# Patient Record
Sex: FEMALE | Race: OTHER
[De-identification: names, ages, dates, MRNs, and addresses within clinical notes are randomized per-mention and may not be internally consistent; named-entity substitution may affect disease eponyms.]

---

## 2018-08-06 ENCOUNTER — HOSPITAL ENCOUNTER (EMERGENCY)
Dept: HOSPITAL 25 - ED | Age: 38
Discharge: HOME | End: 2018-08-06
Payer: COMMERCIAL

## 2018-08-06 VITALS — SYSTOLIC BLOOD PRESSURE: 106 MMHG | DIASTOLIC BLOOD PRESSURE: 69 MMHG

## 2018-08-06 DIAGNOSIS — N83.01: ICD-10-CM

## 2018-08-06 DIAGNOSIS — R10.32: Primary | ICD-10-CM

## 2018-08-06 DIAGNOSIS — D25.9: ICD-10-CM

## 2018-08-06 DIAGNOSIS — N83.02: ICD-10-CM

## 2018-08-06 LAB
BASOPHILS # BLD AUTO: 0.1 10^3/UL (ref 0–0.2)
EOSINOPHIL # BLD AUTO: 0.2 10^3/UL (ref 0–0.6)
HCT VFR BLD AUTO: 38 % (ref 35–47)
HGB BLD-MCNC: 12.4 G/DL (ref 12–16)
LYMPHOCYTES # BLD AUTO: 2.2 10^3/UL (ref 1–4.8)
MCH RBC QN AUTO: 26 PG (ref 27–31)
MCHC RBC AUTO-ENTMCNC: 32 G/DL (ref 31–36)
MCV RBC AUTO: 81 FL (ref 80–97)
MONOCYTES # BLD AUTO: 0.4 10^3/UL (ref 0–0.8)
NEUTROPHILS # BLD AUTO: 3 10^3/UL (ref 1.5–7.7)
NRBC # BLD AUTO: 0 10^3/UL
NRBC BLD QL AUTO: 0.1
PLATELET # BLD AUTO: 212 10^3/UL (ref 150–450)
RBC # BLD AUTO: 4.74 10^6/UL (ref 4–5.4)
WBC # BLD AUTO: 5.9 10^3/UL (ref 3.5–10.8)

## 2018-08-06 PROCEDURE — 85025 COMPLETE CBC W/AUTO DIFF WBC: CPT

## 2018-08-06 PROCEDURE — 86140 C-REACTIVE PROTEIN: CPT

## 2018-08-06 PROCEDURE — 99282 EMERGENCY DEPT VISIT SF MDM: CPT

## 2018-08-06 PROCEDURE — 96372 THER/PROPH/DIAG INJ SC/IM: CPT

## 2018-08-06 PROCEDURE — 36415 COLL VENOUS BLD VENIPUNCTURE: CPT

## 2018-08-06 PROCEDURE — 84702 CHORIONIC GONADOTROPIN TEST: CPT

## 2018-08-06 PROCEDURE — 81003 URINALYSIS AUTO W/O SCOPE: CPT

## 2018-08-06 PROCEDURE — 76830 TRANSVAGINAL US NON-OB: CPT

## 2018-08-06 PROCEDURE — 80053 COMPREHEN METABOLIC PANEL: CPT

## 2018-08-06 NOTE — RAD
INDICATION: 1 week of left pelvic pain



COMPARISON: None.

 

TECHNIQUE: Real-time transabdominal and transvaginal  ultrasound examination of the female

pelvis including grayscale and Doppler color flow imaging.



FINDINGS: 



Uterus: The uterus measures 7.4 x 4.0 x 4.8 cm. The endometrial stripe measures 12 mm in

thickness. At the mid-level left of midline uterus there is a hypoechoic

well-circumscribed structure measuring 1.4 x 1.0 x 1.3 cm most consistent with a uterine

fibroid.



Ovaries: The right and left ovary measure 2.8 x 2.4 x 2.5 cm and 4.5 x 3.0 x 3.6 cm,

respectively. Normal arterial and venous waveforms are identified. At the right ovary

there is a echogenically heterogeneous, partially cystic and minimally vascular structure

measuring 1.8 x 1.3 x 1.7 cm. In the left ovary there is an anechoic avascular structure

measuring 3.4 x 2.8 x 3.0 cm.



There is moderate amount of free fluid in the cul-de-sac.



IMPRESSION: 

1. Small uterine fibroid measuring 1.4 cm in greatest dimension.

2. Bilateral ovarian follicles, complex on the right. Follow-up ultrasound can be acquired

in 6 weeks to ascertain resolution.

3. Small amount of endocervical fluid as well as free fluid in the cul-de-sac which is not

necessarily pathologic in a woman of reproductive age. Please correlate to stage of

menstruation.

## 2018-08-06 NOTE — ED
Abdominal Pain/Female





- HPI Summary


HPI Summary: 


This is lata Parikh documenting for attending Vlad Crews MD.


This patient is a 38 year old F presenting to ED with a chief complaint of L-

sided abdominal pain since 1 week ago. The patient rates the pain 8-9/10 in 

severity. Symptoms aggravated by nothing. Symptoms alleviated by nothing. 

Patient reports frequent urination. Patient denies dysuria and N/V. The patient 

went to [facility] and they said they were not sure what the pain was due 

to but wanted her to come to the ED to r/o ovarian cyst. LMP was July 20, 2018.





- History of Current Complaint


Chief Complaint: EDAbdPain


Stated Complaint: ABD PAIN


Time Seen by Provider: 08/06/18 15:28


Hx Obtained From: Patient


Onset/Duration: Sudden Onset, Lasting Weeks - 1 week ago, Still Present


Timing: Constant - since 1 week ago


Severity Initially: Severe - 8-9/10


Severity Currently: Severe - 8-9/10


Pain Intensity: 8


Pain Scale Used: 0-10 Numeric


Location: Other - L-sided abdominal pain


Aggravating Factor(s): Nothing


Alleviating Factor(s): Nothing


Associated Signs and Symptoms: Positive: Other: - Patient reports frequent 

urination. Patient denies dysuria and N/V.


Allergies/Adverse Reactions: 


 Allergies











Allergy/AdvReac Type Severity Reaction Status Date / Time


 


No Known Allergies Allergy   Verified 08/06/18 15:49














PMH/Surg Hx/FS Hx/Imm Hx


Endocrine/Hematology History: 


   Denies: Hx Diabetes


Cardiovascular History: 


   Denies: Hx Coronary Artery Disease, Hx Hypertension


GI History: Reports: Other GI Disorders - denies hx of constipation


Infectious Disease History: No


Infectious Disease History: 


   Denies: Traveled Outside the [place] in Last 30 Days





- Family History


Known Family History: 


   Negative: Cardiac Disease, Hypertension, Diabetes





- Social History


Alcohol Use: None


Substance Use Type: Reports: None


Smoking Status (MU): Never Smoked Tobacco





Review of Systems


Positive: Abdominal Pain - L-sided abdominal pain.  Negative: Vomiting, Nausea


Positive: frequency.  Negative: dysuria


All Other Systems Reviewed And Are Negative: Yes





Physical Exam





- Summary


Physical Exam Summary: 


VITAL SIGNS: Reviewed. 


GENERAL: Patient is a well-developed and nourished female who is lying 

comfortable in the stretcher. Patient is not in any acute respiratory distress. 


HEAD AND FACE: Normocephalic and atraumatic. 


EYES: PERRLA, EOMI x 2, No injected conjunctiva.


EARS: Hearing grossly intact. Ear canals and tympanic membranes are WNL.


MOUTH: Oropharynx within normal limits. 


NECK: Supple, trachea is midline, no adenopathy, no JVD.


CHEST: Symmetric, no tenderness at palpation 


LUNGS: Clear to auscultation bilaterally. No wheezing or crackles.


CVS: RRR, S1 and S2 present, no murmurs or gallops appreciated. 


ABDOMEN: Soft, L pelvic tenderness, LLQ tenderness. No signs of distention. 

Positive bowel sounds. No rebound no guarding, and no masses palpated. No 

abdominal bruit or pulsations. 


EXTREMITIES: FROM in all major joints, no edema, no cyanosis or clubbing.


NEURO: Alert and oriented x 3. No acute neurological deficits. Speech is normal.


SKIN: Dry and warm


Triage Information Reviewed: Yes


Vital Signs On Initial Exam: 


 Initial Vitals











Temp Pulse Resp BP Pulse Ox


 


 97.3 F   59   16   120/77   98 


 


 08/06/18 14:06  08/06/18 14:06  08/06/18 14:06  08/06/18 14:06  08/06/18 14:06











Vital Signs Reviewed: Yes





Diagnostics





- Vital Signs


 Vital Signs











  Temp Pulse Resp BP Pulse Ox


 


 08/06/18 14:06  97.3 F  59  16  120/77  98














- Laboratory


Result Diagrams: 


 08/06/18 16:06





 08/06/18 16:06


Lab Statement: Any lab studies that have been ordered have been reviewed, and 

results considered in the medical decision making process.





- Ultrasound


  ** No standard instances


Ultrasound Interpretation Completed By: Radiologist - US Transvaginal reveals 

1. Small uterine fibroid measuring 1.4 cm in greatest dimension. 2. Bilateral 

ovarian follicles, complex on the right. Follow-up ultrasound can be acquired 

in 6 weeks to ascertain resolution. 3. Small amount of endocervical fluid as 

well as free fluid in the cul-de-sac which is not necessarily pathologic in a 

woman of reproductive age. Please correlate to stage of menstruation. ED 

physician has reviewed this radiology report. ED physician has reviewed this 

radiology report.





Re-Evaluation





- Re-Evaluation


  ** First Eval


Re-Evaluation Time: 17:43


Comment: The patient doesn't want a CT scan.





Abdominal Pain Fem Course/Dx





- Course


Course Of Treatment: This patient is a 38-year-old female who presents to the 

emergency department after she was transferred from primary care physicians 

office from [place] with a chief complaint of left lower quadrant pain that 

pelvic pain.  The primary care physician had done a pelvic exam and she thinks 

that the patient has a left ovarian cyst.  Therefore the patient was sent for a 

transvaginal ultrasound.  Blood work without any significant abnormality.  

Urinalysis is negative for UTI.  Transvaginal ultrasound impression: Small 

uterine fibroid measuring 1.4 cm in the greatest dimension.  Bilateral ovarian 

follicles, compress on the right.  Follow-up ultrasound to be applied to 6 

weeks to a certain social situation.  Is more amount of endocervical fluid as 

well as free fluid in the cul-de-sac which is not necessarily pathologic in a 

woman of reproductive age.  The patient was given Toradol for pain.  At this 

time the patient pain has improved however not completely.  Therefore I offered 

the patient and abdominal pelvic CT to rule out any type of diverticulitis or 

any abdominal pathology.  The patient declined.  The patient refused because 

she was feeling better and she has to finish work, presentation.  Therefore the 

patient was to be discharged home and if the patient worsen she will return to 

the emergency room for further workup and management.  At this point the patient

s hemoglobin after stable alert and oriented 3.





- Diagnoses


Differential Diagnosis: Positive: Other - abdominal pain


Provider Diagnoses: 


 Lower abdominal pain








Discharge





- Sign-Out/Discharge


Documenting (check all that apply): Patient Departure





- Discharge Plan


Condition: Stable


Disposition: HOME


Prescriptions: 


Naproxen [Naproxen 500 mg tab] 500 mg PO BID PRN #30 tablet.dr


 PRN Reason: Pain


Patient Education Materials:  Abdominal Pain (ED)


Referrals: 


[facility] [Outside] - 3 Days


Additional Instructions: 


RETURN TO THE ED FOR ANY WORSENING OR NEW SYMPTOMS.





- Billing Disposition and Condition


Condition: STABLE


Disposition: Home